# Patient Record
Sex: FEMALE | Race: WHITE | NOT HISPANIC OR LATINO | Employment: OTHER | URBAN - METROPOLITAN AREA
[De-identification: names, ages, dates, MRNs, and addresses within clinical notes are randomized per-mention and may not be internally consistent; named-entity substitution may affect disease eponyms.]

---

## 2018-10-09 PROBLEM — S42.291A CLOSED 3-PART FRACTURE OF PROXIMAL END OF RIGHT HUMERUS: Status: ACTIVE | Noted: 2018-10-09

## 2018-10-09 PROBLEM — S42.202A NONDISPLACED FRACTURE OF PROXIMAL END OF LEFT HUMERUS: Status: ACTIVE | Noted: 2018-10-09

## 2018-12-10 ENCOUNTER — HOSPITAL ENCOUNTER (EMERGENCY)
Facility: HOSPITAL | Age: 48
Discharge: HOME | End: 2018-12-10
Attending: EMERGENCY MEDICINE
Payer: MEDICARE

## 2018-12-10 VITALS
HEART RATE: 93 BPM | DIASTOLIC BLOOD PRESSURE: 83 MMHG | WEIGHT: 165 LBS | TEMPERATURE: 99 F | OXYGEN SATURATION: 99 % | SYSTOLIC BLOOD PRESSURE: 147 MMHG | HEIGHT: 63 IN | RESPIRATION RATE: 16 BRPM | BODY MASS INDEX: 29.23 KG/M2

## 2018-12-10 DIAGNOSIS — S01.81XA FACIAL LACERATION, INITIAL ENCOUNTER: Primary | ICD-10-CM

## 2018-12-10 DIAGNOSIS — S60.221A CONTUSION OF RIGHT HAND, INITIAL ENCOUNTER: ICD-10-CM
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
## 2018-12-10 PROCEDURE — 12011 RPR F/E/E/N/L/M 2.5 CM/<: CPT

## 2018-12-10 PROCEDURE — 90715 TDAP VACCINE 7 YRS/> IM: CPT | Performed by: PHYSICIAN ASSISTANT

## 2018-12-10 PROCEDURE — 90471 IMMUNIZATION ADMIN: CPT | Performed by: PHYSICIAN ASSISTANT

## 2018-12-10 PROCEDURE — 25000003 PHARM REV CODE 250: Performed by: PHYSICIAN ASSISTANT

## 2018-12-10 PROCEDURE — 99284 EMERGENCY DEPT VISIT MOD MDM: CPT | Mod: 25

## 2018-12-10 PROCEDURE — 63600175 PHARM REV CODE 636 W HCPCS: Performed by: PHYSICIAN ASSISTANT

## 2018-12-10 RX ORDER — LIDOCAINE HYDROCHLORIDE 10 MG/ML
10 INJECTION INFILTRATION; PERINEURAL
Status: COMPLETED | OUTPATIENT
Start: 2018-12-10 | End: 2018-12-10

## 2018-12-10 RX ORDER — ACETAMINOPHEN 325 MG/1
650 TABLET ORAL
Status: COMPLETED | OUTPATIENT
Start: 2018-12-10 | End: 2018-12-10

## 2018-12-10 RX ADMIN — CLOSTRIDIUM TETANI TOXOID ANTIGEN (FORMALDEHYDE INACTIVATED), CORYNEBACTERIUM DIPHTHERIAE TOXOID ANTIGEN (FORMALDEHYDE INACTIVATED), BORDETELLA PERTUSSIS TOXOID ANTIGEN (GLUTARALDEHYDE INACTIVATED), BORDETELLA PERTUSSIS FILAMENTOUS HEMAGGLUTININ ANTIGEN (FORMALDEHYDE INACTIVATED), BORDETELLA PERTUSSIS PERTACTIN ANTIGEN, AND BORDETELLA PERTUSSIS FIMBRIAE 2/3 ANTIGEN 0.5 ML: 5; 2; 2.5; 5; 3; 5 INJECTION, SUSPENSION INTRAMUSCULAR at 12:12

## 2018-12-10 RX ADMIN — ACETAMINOPHEN 650 MG: 325 TABLET ORAL at 12:12

## 2018-12-10 RX ADMIN — LIDOCAINE HYDROCHLORIDE 10 ML: 10 INJECTION, SOLUTION INFILTRATION; PERINEURAL at 12:12

## 2018-12-10 NOTE — DISCHARGE INSTRUCTIONS
Keep wounds clean and dry.  Wash with soap and water.  Have sutures removed in 5-7 days by your primary care provider.  For worsening symptoms, chest pain, shortness of breath, increased abdominal pain, high grade fever, stroke or stroke like symptoms, immediately go to the nearest Emergency Room or call 911 as soon as possible.

## 2018-12-10 NOTE — PROGRESS NOTES
I have evaluated and performed a medical screening assessment on this patient while awaiting a thorough evaluation and treatment. All of the emergency department beds are occupied at this time. When appropriate, laboratory studies will be ordered from triage. The patient has been advised of this process and care plan. Patient complains of facial laceration after tripping in the parking lot on a piece of concrete hitting her face. She denied LOC.    Orders pending: Tdap  Disposition: qtrack

## 2018-12-10 NOTE — ED PROVIDER NOTES
Encounter Date: 12/10/2018    SCRIBE #1 NOTE: Anju AHN am scribing for, and in the presence of, Sophia Rubio PA-C.       History     Chief Complaint   Patient presents with    Fall    Facial Laceration       Time seen by provider: 12:05 PM on 12/10/2018    Anette Da Silva is a 48 y.o. female with a hx of HTN, COPD, seizures, and ARDS who presents to the ED with c/o laceration to the chin and right wrist pain s/p fall PTA. She tripped and fell onto the concrete parking curb. She states she recently broke both of her arms and is having some increased pain to the right arm since the fall. She denied any numbness/tingling. No change to teeth alignment. Pt is unsure when her last tetanus vaccination was. NKDA noted. She denied any LOC, vomiting or neck pain.      The history is provided by the patient.     Review of patient's allergies indicates:  No Known Allergies  Past Medical History:   Diagnosis Date    COPD (chronic obstructive pulmonary disease)     Fractures     History of ARDS     Hypertension     Osteoarthritis     hips, knees    Osteonecrosis     Osteoporosis     Seizures      Past Surgical History:   Procedure Laterality Date    ARTHROPLASTY-HIP Left 11/15/2016    Performed by Janes Sorenson [facility] at [facility] OR    BACK SURGERY      lumbar fusion with titanium cage and screws    c sections X 2       SECTION      x2    FOOT SURGERY Right 2013    Bone graft to heel    FOOT SURGERY Left 2014    Bone graft to heel    HYSTERECTOMY      sharad salpingo oophrectomy    JOINT REPLACEMENT Bilateral     knee    SPINAL FUSION  2006    Fusion/cage    TOTAL HIP ARTHROPLASTY Left 11/15/2016    TRUMAN    TOTAL HIP ARTHROPLASTY Right     TOTAL KNEE ARTHROPLASTY Bilateral     TKA's     Family History   Problem Relation Age of Onset    Hypertension Mother     Arthritis Mother     Thyroid disease Mother     Heart disease Father     Hypertension Father      Social History     Tobacco Use     Smoking status: Current Every Day Smoker     Packs/day: 0.25    Tobacco comment: currently using patch to stop   Substance Use Topics    Alcohol use: No    Drug use: Not on file     Review of Systems   Constitutional: Negative for activity change, appetite change, chills and fever.   HENT: Negative for congestion, rhinorrhea and sore throat.    Eyes: Negative for redness and visual disturbance.   Respiratory: Negative for cough, chest tightness and shortness of breath.    Cardiovascular: Negative for chest pain.   Gastrointestinal: Negative for abdominal pain, diarrhea, nausea and vomiting.   Genitourinary: Negative for dysuria and frequency.   Musculoskeletal: Positive for arthralgias (Rt wrist ). Negative for back pain, neck pain and neck stiffness.   Skin: Positive for wound (Laceration). Negative for rash.   Neurological: Negative for dizziness, syncope, numbness and headaches.       Physical Exam     Initial Vitals [12/10/18 1137]   BP Pulse Resp Temp SpO2   (!) 147/83 93 16 98.8 °F (37.1 °C) 99 %      MAP       --         Physical Exam    Nursing note and vitals reviewed.  Constitutional: She appears well-developed and well-nourished. She is cooperative.  Non-toxic appearance. She does not have a sickly appearance.   HENT:   Head: Normocephalic.   Right Ear: External ear normal.   Left Ear: External ear normal.   Nose: Nose normal.   Mouth/Throat: Oropharynx is clear and moist. No trismus in the jaw.   No oral injury. 2 cm laceration to the chin.    Eyes: Conjunctivae and lids are normal. Pupils are equal, round, and reactive to light.   Neck: Normal range of motion and full passive range of motion without pain. Neck supple.   Cardiovascular: Normal rate, regular rhythm and normal heart sounds. Exam reveals no gallop and no friction rub.    No murmur heard.  Pulses:       Radial pulses are 2+ on the right side, and 2+ on the left side.   Pulmonary/Chest: Breath sounds normal. She has no wheezes. She has no  rhonchi. She has no rales.   Abdominal: Soft. Normal appearance. There is no tenderness. There is no rigidity, no rebound and no guarding.   Musculoskeletal: She exhibits tenderness.   Mild tenderness to right humerus. No pain with ROM of the right elbow or shoulder. Pain with FROM of right wrist. No snuffbox tenderness.    Neurological: She is alert and oriented to person, place, and time. GCS eye subscore is 4. GCS verbal subscore is 5. GCS motor subscore is 6.   Skin: Skin is warm, dry and intact. No rash noted.         ED Course   Lac Repair  Date/Time: 12/10/2018 1:04 PM  Performed by: Sophia Rubio PA-C  Authorized by: Jim Mendoza MD   Consent Done: Yes  Consent: Verbal consent obtained.  Consent given by: patient  Body area: head/neck  Location details: chin  Foreign bodies: no foreign bodies  Tendon involvement: none  Nerve involvement: none  Anesthesia: local infiltration    Anesthesia:  Local Anesthetic: lidocaine 1% without epinephrine  Anesthetic total: 3 mL  Patient sedated: no  Preparation: Patient was prepped and draped in the usual sterile fashion.  Irrigation solution: saline  Irrigation method: syringe  Amount of cleaning: standard  Debridement: none  Degree of undermining: none  Skin closure: 5-0 nylon  Number of sutures: 4  Technique: simple  Approximation: close  Approximation difficulty: simple  Dressing: 4x4 sterile gauze  Patient tolerance: Patient tolerated the procedure well with no immediate complications        Labs Reviewed - No data to display       Imaging Results          X-Ray Humerus 2 View Right (Final result)  Result time 12/10/18 12:52:47    Final result by Nick Toth MD (12/10/18 12:52:47)                 Impression:      No acute fracture/dislocation right humerus or right wrist.    Healing right proximal humerus fracture.      Electronically signed by: Nick Toth MD  Date:    12/10/2018  Time:    12:52             Narrative:     EXAMINATION:  XR WRIST COMPLETE 3 VIEWS RIGHT; XR HUMERUS 2 VIEW RIGHT    CLINICAL HISTORY:  Unspecified fall, initial encounter    TECHNIQUE:  PA, lateral, and oblique views of the right wrist were performed.  Two views right humerus    COMPARISON:  None    FINDINGS:  Three views right wrist demonstrate no fracture or dislocation.    Two views of the right humerus demonstrate an impacted fracture of the humeral neck with callus formation and no evidence for new fracture/dislocation.                               X-Ray Wrist Complete Right (Final result)  Result time 12/10/18 12:52:47    Final result by Nick Toth MD (12/10/18 12:52:47)                 Impression:      No acute fracture/dislocation right humerus or right wrist.    Healing right proximal humerus fracture.      Electronically signed by: Nick Toth MD  Date:    12/10/2018  Time:    12:52             Narrative:    EXAMINATION:  XR WRIST COMPLETE 3 VIEWS RIGHT; XR HUMERUS 2 VIEW RIGHT    CLINICAL HISTORY:  Unspecified fall, initial encounter    TECHNIQUE:  PA, lateral, and oblique views of the right wrist were performed.  Two views right humerus    COMPARISON:  None    FINDINGS:  Three views right wrist demonstrate no fracture or dislocation.    Two views of the right humerus demonstrate an impacted fracture of the humeral neck with callus formation and no evidence for new fracture/dislocation.                                 Medical Decision Making:   History:   Old Medical Records: I decided to obtain old medical records.  Clinical Tests:   Lab Tests: Ordered and Reviewed       APC / Resident Notes:   Urgent evaluation of a 48-year-old female who presents with laceration to the chin after mechanical fall prior to arrival.  She is well appearing.  Vital signs are stable. She has a laceration to the chin that is not full thickness.  She has no trismus.  No oral injury. Uvula is midline.  She has some pain to the right humerus with  palpation.  She is neurovascularly intact. X-ray showed no acute fracture.  Laceration repair performed, see procedure.  Tetanus updated neuro exam is normal. No indication for CT.  No cervical spinous process tenderness. Discussed results with patient. Return precautions given. Based on my clinical evaluation, I do not appreciate any immediate, emergent, or life threatening condition or etiology that warrants additional workup today and feel that the patient can be discharged with close follow up care.  Patient is to follow up with their primary care provider. Case was discussed with Dr. Mendoza who is in agreement with the plan of care. All questions answered.          Scribe Attestation:   Scribe #1: I performed the above scribed service and the documentation accurately describes the services I performed. I attest to the accuracy of the note.    I, Sophia Rubio PA-C, personally performed the services described in this documentation. All medical record entries made by the scribe were at my direction and in my presence.  I have reviewed the chart and agree that the record reflects my personal performance and is accurate and complete. Sophia Rubio PA-C.  10:07 AM 12/11/2018             Clinical Impression:     1. Facial laceration, initial encounter    2. Fall    3. Contusion of right hand, initial encounter                                 Sophia Rubio PA-C  12/11/18 1014

## 2019-01-04 PROBLEM — Z96.653 HISTORY OF TOTAL KNEE ARTHROPLASTY, BILATERAL: Status: ACTIVE | Noted: 2019-01-04

## 2021-05-10 ENCOUNTER — PATIENT MESSAGE (OUTPATIENT)
Dept: RESEARCH | Facility: HOSPITAL | Age: 51
End: 2021-05-10